# Patient Record
Sex: MALE | Race: WHITE | Employment: OTHER | URBAN - METROPOLITAN AREA
[De-identification: names, ages, dates, MRNs, and addresses within clinical notes are randomized per-mention and may not be internally consistent; named-entity substitution may affect disease eponyms.]

---

## 2020-02-13 ENCOUNTER — OFFICE VISIT (OUTPATIENT)
Dept: DERMATOLOGY | Facility: AMBULATORY SURGERY CENTER | Age: 85
End: 2020-02-13

## 2020-02-13 ENCOUNTER — HOSPITAL ENCOUNTER (OUTPATIENT)
Dept: LAB | Age: 85
Discharge: HOME | End: 2020-02-13

## 2020-02-13 VITALS
HEIGHT: 74 IN | TEMPERATURE: 98.2 F | BODY MASS INDEX: 25.03 KG/M2 | SYSTOLIC BLOOD PRESSURE: 160 MMHG | RESPIRATION RATE: 16 BRPM | HEART RATE: 71 BPM | OXYGEN SATURATION: 97 % | DIASTOLIC BLOOD PRESSURE: 62 MMHG | WEIGHT: 195 LBS

## 2020-02-13 DIAGNOSIS — D22.9 MULTIPLE BENIGN NEVI: ICD-10-CM

## 2020-02-13 DIAGNOSIS — L82.0 INFLAMED SEBORRHEIC KERATOSIS: Primary | ICD-10-CM

## 2020-02-13 DIAGNOSIS — L70.0 COMEDONE: ICD-10-CM

## 2020-02-13 DIAGNOSIS — D48.7 NEOPLASM OF UNCERTAIN BEHAVIOR OF NECK: ICD-10-CM

## 2020-02-13 DIAGNOSIS — D48.5 NEOPLASM OF UNCERTAIN BEHAVIOR OF SKIN OF BACK: ICD-10-CM

## 2020-02-13 DIAGNOSIS — L82.1 SEBORRHEIC KERATOSES: ICD-10-CM

## 2020-02-13 DIAGNOSIS — L72.9 CYST OF SKIN: ICD-10-CM

## 2020-02-13 RX ORDER — LOSARTAN POTASSIUM AND HYDROCHLOROTHIAZIDE 12.5; 1 MG/1; MG/1
1 TABLET ORAL
COMMUNITY

## 2020-02-13 RX ORDER — METFORMIN HYDROCHLORIDE 1000 MG/1
1000 TABLET ORAL
COMMUNITY
Start: 2019-10-30

## 2020-02-13 RX ORDER — ASPIRIN 325 MG
325 TABLET ORAL DAILY
COMMUNITY

## 2020-02-13 RX ORDER — ATORVASTATIN CALCIUM 20 MG/1
TABLET, FILM COATED ORAL
COMMUNITY
Start: 2019-12-03

## 2020-02-13 RX ORDER — AMLODIPINE BESYLATE AND ATORVASTATIN CALCIUM 5; 10 MG/1; MG/1
1 TABLET, FILM COATED ORAL
COMMUNITY

## 2020-02-13 RX ORDER — METOPROLOL SUCCINATE 50 MG/1
TABLET, EXTENDED RELEASE ORAL
COMMUNITY
Start: 2019-12-03

## 2020-02-13 RX ORDER — GLIPIZIDE 10 MG/1
10 TABLET, FILM COATED, EXTENDED RELEASE ORAL 2 TIMES DAILY
COMMUNITY

## 2020-02-13 NOTE — PROGRESS NOTES
Written by Johnna Quinn, as dictated by Peng Cleary, [address]. Name: Kaylen Manzo       Age: 82 y.o. Date: 2/13/2020    Chief Complaint: skin problem    Subjective:    HPI  Mr. Kaylen Manzo is a 82 y.o. male who presents as a new patient to [facility] for a skin exam. He is accompanied by his wife. The patient has had a skin exam in the past and the patient does have current complaints related to his skin. He first noticed the spot on his back when he went to the hospital in 08/2019, and it has been itching since that time. He states a nurse noted this as well and told him to have I checked during that hospitalization. He has also noticed a lesion on his right lateral neck that is irritating, itching and he will occasionally scratch. He has a cyst on his mid chest that first appeared 20 years ago, and has grown over the course of time. He only notices pain when the area is hit or pressed. He notes that it has been years since he last had any lesions or cysts removed. He is feeling well and in his usual state of health today. He has no current illnesses, no other skin concerns. His allergies, medications, medical, and social history are reviewed by me today. The patient's pertinent skin history includes:   No personal or FH of skin cancer but does admit to having cysts removed and one lesion that had to be removed twice    ROS: Constitutional: Negative.     Dermatological : positive for - skin lesion changes    Social History     Socioeconomic History    Marital status:      Spouse name: Not on file    Number of children: Not on file    Years of education: Not on file    Highest education level: Not on file   Occupational History    Not on file   Social Needs    Financial resource strain: Not on file    Food insecurity:     Worry: Not on file     Inability: Not on file    Transportation needs:     Medical: Not on file Non-medical: Not on file   Tobacco Use    Smoking status: Never Smoker    Smokeless tobacco: Never Used    Tobacco comment: Chewing tobacco every day 30-40 years    Substance and Sexual Activity    Alcohol use: Not Currently    Drug use: Never    Sexual activity: Not on file   Lifestyle    Physical activity:     Days per week: Not on file     Minutes per session: Not on file    Stress: Not on file   Relationships    Social connections:     Talks on phone: Not on file     Gets together: Not on file     Attends Faith service: Not on file     Active member of club or organization: Not on file     Attends meetings of clubs or organizations: Not on file     Relationship status: Not on file    Intimate partner violence:     Fear of current or ex partner: Not on file     Emotionally abused: Not on file     Physically abused: Not on file     Forced sexual activity: Not on file   Other Topics Concern    Not on file   Social History Narrative    Not on file       Family History   Problem Relation Age of Onset    Heart Disease Mother     Cancer Father     Cancer Sister     Cancer Brother        Past Medical History:   Diagnosis Date    Diabetes ([facility] [address]     Hypercholesteremia     Hypertension     Prostate cancer ([facility] [address]     Sunburn, blistering        Past Surgical History:   Procedure Laterality Date    HX CHOLECYSTECTOMY      HX CORONARY ARTERY BYPASS GRAFT      HX HERNIA REPAIR      HX LUMBAR FUSION             No Known Allergies      Objective:    Visit Vitals  /62 (BP 1 Location: Left arm, BP Patient Position: Sitting)   Pulse 71   Temp 98.2 °F (36.8 °C) (Oral)   Resp 16   Ht 6' 2\" (1.88 m)   Wt 195 lb (88.5 kg)   SpO2 97%   BMI 25.04 kg/m²       Wanda Cantrell is a 82 y.o. male who appears well and in no distress. He is awake, alert, and oriented.  A skin examination was performed including his back, neck and chest.  He has several cysts and comedones on his back that are not inflamed. There is a medium and dark brown macule that is 4 mm x 3mm on his left posterior neck. He has a 5 mm x 4 mm medium and dark brown, irregularly shaped macule on his left mid back. He has a pink macule 12 mm x 11 mm with hemorrhagic crusting on his right neck. There is an approximate 3 cm mobile, subcutaneous nodule on his mid chest that is non-inflamed and most consistent with cyst. Linear scar on the back does not have evidence of lesion recurrence. There is a scaly stuck on appearing papule on the left lateral upper back, pink with inflammation consistent with an inflamed SK - his lesion w/ pruritis. There are other scattered non inflamed SKs, pink and brown nevi without concerning features of severe atypia. Photos from today's visit:    Left Mid Back  Left Posterior Neck Right Neck   Mid Chest    Assessment/Plan:    1. Inflamed seborrheic keratoses. The diagnosis and treatment with liquid nitrogen cryotherapy were reviewed. The risk or persistence or recurrence of the keratosis and the potential for pigment change at the treated site were reviewed. Verbal consent was obtained. I treated 1 lesions with cryotherapy and care was reviewed. 2. Neoplasm of Uncertain Behavior, r/o atypical nevi, left posterior back. The differential diagnoses were discussed. Benefits and restrictions of biopsy, clinical monitoring, and molecular test with [employer] were discussed. [employer] was advised to test this lesion. The procedure was reviewed and verbal consent was obtained. The patient is aware that this is a molecular test and is intended for testing melanoma risk and not diagnosis of the skin lesion. I performed the procedure. The site was cleansed and scrubbed and four stickers were applied in sequential order. I will contact the patient with the results and any further treatment that may be necessary. 3. Neoplasm of Uncertain Behavior, r/o atypical nevi, left mid back.   The differential diagnoses were discussed. Benefits and restrictions of biopsy, clinical monitoring, and molecular test with [facility] were discussed. [employer] was advised to test this lesion. The procedure was reviewed and verbal consent was obtained. The patient is aware that this is a molecular test and is intended for testing melanoma risk and not diagnosis of the skin lesion. I performed the procedure. The site was cleansed and scrubbed and four stickers were applied in sequential order. I will contact the patient with the results and any further treatment that may be necessary. 4. Neoplasm of Uncertain Behavior, favor BCC, right neck. The differential diagnoses were discussed. A shave biopsy was advised to sample this lesion. The procedure was reviewed and verbal and written consent were obtained. The risks of pain, bleeding, infection, and scar were discussed. The patient is aware that this is a sample and is intended for diagnosis and not therapy of the skin lesion. I performed the procedure. The site was cleansed and anesthetized with 1% Lidocaine with Epinephrine 1:100,000. A shave biopsy was performed to sample the lesion. Drysol was used for hemostasis. The wound was bandaged and care reviewed. The specimen was sent to pathology. I will contact the patient with the results and any further treatment that may be necessary. 5.Seborrheic keratoses. The diagnosis was reviewed and the patient was reassured that no treatment is needed for these benign lesions. 6. Comdones, cysts. The patient would like the cyst on his chest removed. Discussed and pt seen w/ Dr. Jennifer Baugh - will schedule once the above issues are resolved. 7.Normal nevi. The diagnosis of normal nevi was reviewed. I discussed sun protection, sunscreen use, the warning signs of skin cancer, mole monitoring, the need for self-skin examinations, and the need for regular practitioner exams.   The patient should follow up sooner as needed if new, changing, or symptomatic skin lesions arise. [facility]   OFFICE PROCEDURE PROGRESS NOTE   Chart reviewed for the following:   Edgar GARAY, have reviewed the History, Physical and updated the Allergic reactions for [employer]. TIME OUT performed immediately prior to start of procedure:   Debra GARAY, have performed the following reviews on [employer]   prior to the start of the procedure:     * Patient was identified by name and date of birth   * Agreement on procedure being performed was verified   * Risks and Benefits explained to the patient   * Procedure site verified and marked as necessary   * Patient was positioned for comfort   * Consent was signed and verified     Time: 2:25  Date of procedure: 2/13/2020  Procedure performed by: Harmony Decker. Raphael Wood  Provider assisted by: Derick Bates LPN  Patient assisted by: self   How tolerated by patient: tolerated the procedure well with no complications   Comments: none    Next skin exam :  As Needed. This plan was reviewed with the patient and patient agrees. All questions were answered. This scribe documentation was reviewed by me and accurately reflects the examination and decisions made by me.

## 2020-02-19 NOTE — PROGRESS NOTES
I spoke w/ pt and he is aware of the diagnosis of [facility] which will need Mohs. He also need biopsy of nevi due to positive [employer] testing - I arranged this for this Friday. We will schedule appts based on those findings.

## 2020-02-21 ENCOUNTER — OFFICE VISIT (OUTPATIENT)
Dept: DERMATOLOGY | Facility: AMBULATORY SURGERY CENTER | Age: 85
End: 2020-02-21

## 2020-02-21 VITALS
DIASTOLIC BLOOD PRESSURE: 88 MMHG | RESPIRATION RATE: 18 BRPM | HEART RATE: 79 BPM | WEIGHT: 195 LBS | SYSTOLIC BLOOD PRESSURE: 156 MMHG | OXYGEN SATURATION: 97 % | HEIGHT: 74 IN | BODY MASS INDEX: 25.03 KG/M2 | TEMPERATURE: 98 F

## 2020-02-21 DIAGNOSIS — D48.5 NEOPLASM OF UNCERTAIN BEHAVIOR OF SKIN OF NECK: Primary | ICD-10-CM

## 2020-02-21 DIAGNOSIS — D48.5 NEOPLASM OF UNCERTAIN BEHAVIOR OF SKIN OF BACK: ICD-10-CM

## 2020-02-21 DIAGNOSIS — C44.41 BASAL CELL CARCINOMA (BCC) OF RIGHT SIDE OF NECK: ICD-10-CM

## 2020-02-21 NOTE — PROGRESS NOTES
Written by Edilson Aviles, as dictated by Saida Arnold Plan, [address]. Name: Fiona Aguilar       Age: 82 y.o. Date: 2/21/2020    Chief Complaint:   Chief Complaint   Patient presents with    Skin Exam     spots to biopsy        Subjective:    HPI:  Mr.. Fiona Aguilar is a 82 y.o. male who presents for biopsies of [employer] proven LINC positive lesions on the left posterior neck and the left mid back. He is feeling well and in his usual state of health today. He has no current illnesses, no other skin concerns. His allergies, medications, medical, and social history are reviewed by me today.     ROS: Consitutional: Negative  Dermatological : negative    Social History     Socioeconomic History    Marital status:      Spouse name: Not on file    Number of children: Not on file    Years of education: Not on file    Highest education level: Not on file   Occupational History    Not on file   Social Needs    Financial resource strain: Not on file    Food insecurity:     Worry: Not on file     Inability: Not on file    Transportation needs:     Medical: Not on file     Non-medical: Not on file   Tobacco Use    Smoking status: Never Smoker    Smokeless tobacco: Never Used    Tobacco comment: Chewing tobacco every day 30-40 years    Substance and Sexual Activity    Alcohol use: Not Currently    Drug use: Never    Sexual activity: Not on file   Lifestyle    Physical activity:     Days per week: Not on file     Minutes per session: Not on file    Stress: Not on file   Relationships    Social connections:     Talks on phone: Not on file     Gets together: Not on file     Attends Yazidism service: Not on file     Active member of club or organization: Not on file     Attends meetings of clubs or organizations: Not on file     Relationship status: Not on file    Intimate partner violence:     Fear of current or ex partner: Not on file     Emotionally abused: Not on file Physically abused: Not on file     Forced sexual activity: Not on file   Other Topics Concern    Not on file   Social History Narrative    Not on file       Family History   Problem Relation Age of Onset    Heart Disease Mother     Cancer Father     Cancer Sister     Cancer Brother        Past Medical History:   Diagnosis Date    Diabetes ([facility] [address]     Hypercholesteremia     Hypertension     Prostate cancer ([facility] [address]     Sunburn, blistering        Past Surgical History:   Procedure Laterality Date    HX CHOLECYSTECTOMY      HX CORONARY ARTERY BYPASS GRAFT      HX HERNIA REPAIR      HX LUMBAR FUSION         Current Outpatient Medications   Medication Sig Dispense Refill    amLODIPine-atorvastatin (CADUET) 5-10 mg per tablet Take 1 Tab by mouth.  atorvastatin (LIPITOR) 20 mg tablet TAKE 1 TABLET BY MOUTH DAILY      glipiZIDE SR (GLUCOTROL XL) 10 mg CR tablet Take 10 mg by mouth two (2) times a day.  losartan-hydroCHLOROthiazide (HYZAAR) 100-12.5 mg per tablet Take 1 Tab by mouth.  metFORMIN (GLUCOPHAGE) 1,000 mg tablet Take 1,000 mg by mouth.  metoprolol succinate (TOPROL-XL) 50 mg XL tablet TAKE 1 TABLET BY MOUTH DAILY      aspirin (ASPIRIN) 325 mg tablet Take 325 mg by mouth daily. No Known Allergies      Objective:    Visit Vitals  /88 (BP 1 Location: Left arm, BP Patient Position: Sitting)   Pulse 79   Temp 98 °F (36.7 °C) (Oral)   Resp 18   Ht 6' 2\" (1.88 m)   Wt 195 lb (88.5 kg)   SpO2 97%   BMI 25.04 kg/m²       May Rivers is a 82 y.o. male who appears well and in no distress. He is awake, alert, and oriented. A limited skin examination was completed including the neck and back. He has a 5 mm x 4 mm medium and dark brown, irregularly shaped macule on his left mid back, R/O atypical pigmented lesion. He has a pink macule 12 mm x 11 mm with hemorrhagic crusting on his right neck, - this is healing from recent biopsy - proving BCC. There is a medium and dark brown macule that is 4 mm x 3mm on his left posterior neck, r/o atypical pigmented lesion    Photos from today's visit:       Left neck   Left mid back    Assessment/Plan:  Neoplasm of Uncertain Behavior, left posterior neck, R/O atypical pigmented lesion . The differential diagnoses were discussed. A shave biopsy was advised to sample this lesion. The procedure was reviewed and verbal and written consent were obtained. The risks of pain, bleeding, infection, and scar were discussed. The patient is aware that this is a sample and is intended for diagnosis and not therapy of the skin lesion. I performed the procedure. The site was cleansed and anesthetized with 1% Lidocaine with Epinephrine 1:100,000. A shave biopsy was performed to sample the lesion. Drysol was used for hemostasis. The wound was bandaged and care reviewed. The specimen was sent to pathology. I will contact the patient with the results and any further treatment that may be necessary. Neoplasm of Uncertain Behavior, left mid back, R/O atypical pigmented lesion. The differential diagnoses were discussed. A shave biopsy was advised to sample this lesion. The procedure was reviewed and verbal and written consent were obtained. The risks of pain, bleeding, infection, and scar were discussed. The patient is aware that this is a sample and is intended for diagnosis and not therapy of the skin lesion. I performed the procedure. The site was cleansed and anesthetized with 1% Lidocaine with Epinephrine 1:100,000. A shave biopsy was performed to sample the lesion. Drysol was used for hemostasis. The wound was bandaged and care reviewed. The specimen was sent to pathology. I will contact the patient with the results and any further treatment that may be necessary. Basal cell carcinoma of the right lateral neck. Will schedule an appt for Mohs. This plan was reviewed with the patient and patient agrees.  All questions were answered. This scribe documentation was reviewed by me and accurately reflects the examination and decisions made by me. [facility]   OFFICE PROCEDURE PROGRESS NOTE   Chart reviewed for the following:   Teodora GARAY, have reviewed the History, Physical and updated the Allergic reactions for Ethel Guillen. TIME OUT performed immediately prior to start of procedure:   Debra GARAY, have performed the following reviews on Ethel Guillen   prior to the start of the procedure:     * Patient was identified by name and date of birth   * Agreement on procedure being performed was verified   * Risks and Benefits explained to the patient   * Procedure site verified and marked as necessary   * Patient was positioned for comfort   * Consent was signed and verified     Time: 9:40 pm  Date of procedure: 2/21/2020  Procedure performed by: Prabhjot Escobedo.  Remberto Briggs  Provider assisted by: Hilaria Jin LPN   Patient assisted by: self   How tolerated by patient: tolerated the procedure well with no complications   Comments: none

## 2020-02-22 ENCOUNTER — HOSPITAL ENCOUNTER (OUTPATIENT)
Dept: LAB | Age: 85
Discharge: HOME | End: 2020-02-22

## 2020-03-04 ENCOUNTER — TELEPHONE (OUTPATIENT)
Dept: DERMATOLOGY | Facility: AMBULATORY SURGERY CENTER | Age: 85
End: 2020-03-04

## 2020-03-04 NOTE — TELEPHONE ENCOUNTER
I spoke with the pt and he is aware of the diagnosis of moderate atypical nevus on the neck and moderate to severe atypical nevus on the back - suggested further removal by pathologist but clear margins were obtained. He has an appt for Mohs on 3/16 and hopefully a small excision or re-shave can be done with this appt time allowing.

## 2020-03-16 ENCOUNTER — HOSPITAL ENCOUNTER (OUTPATIENT)
Dept: LAB | Age: 85
Discharge: HOME | End: 2020-03-16

## 2020-03-16 ENCOUNTER — OFFICE VISIT (OUTPATIENT)
Dept: DERMATOLOGY | Facility: AMBULATORY SURGERY CENTER | Age: 85
End: 2020-03-16

## 2020-03-16 VITALS
BODY MASS INDEX: 25.03 KG/M2 | TEMPERATURE: 98 F | HEIGHT: 74 IN | RESPIRATION RATE: 14 BRPM | WEIGHT: 195 LBS | SYSTOLIC BLOOD PRESSURE: 130 MMHG | DIASTOLIC BLOOD PRESSURE: 70 MMHG | HEART RATE: 79 BPM | OXYGEN SATURATION: 99 %

## 2020-03-16 DIAGNOSIS — D22.4 ATYPICAL NEVUS OF NECK: Primary | ICD-10-CM

## 2020-03-16 DIAGNOSIS — C44.41 BASAL CELL CARCINOMA (BCC) OF RIGHT SIDE OF NECK: Primary | ICD-10-CM

## 2020-03-16 DIAGNOSIS — D22.5 ATYPICAL NEVUS OF BACK: ICD-10-CM

## 2020-03-16 NOTE — PROGRESS NOTES
Luly Daly is a 82 y.o. male presents to the office today with the following:  Chief Complaint   Patient presents with    Surgery     Excision - 2 sites on back       53-year-old white male presents for surgical excision of biopsy-proven dysplastic nevus on the left neck and biopsy-proven dysplastic nevus on the mid back. They were recently biopsied by Devin Flowers NP. Is not any problems with the biopsy site since that time. He is otherwise in his normal state of good health and has no additional skin complaints today. Of note this morning he was treated with Mohs surgery by me for a biopsy-proven basal cell carcinoma on the right neck. Physical Exam  HENT:      Head: Normocephalic. Pulmonary:      Effort: Pulmonary effort is normal.   Skin:     Comments: On the left posterior neck there is a crusted papule. On the mid back there is a red crusted plaque. Neurological:      Mental Status: He is alert and oriented to person, place, and time. 1. Atypical nevus of back  Excision was advised for removal and therapy of this 1.0 cm biopsy-proven dysplastic nevus on the left mid back. The excision procedure was reviewed and verbal and written consents were obtained. The site was identified and confirmed with the patient. The risks of pain, bleeding, infection, recurrence of the lesion, and scar were discussed. I performed the procedure. The site was cleansed and anesthetized with 1% lidocaine with epinephrine 1:100,000. The lesion was excised with a 3 mm margin in an elliptical manner to a depth of subcutaneous tissue. A intermediate primary closure was performed after the excision using 4-0 Maxon buried vertical mattress sutures and Tegaderm. The closure length was 5.6 cm. The wound was bandaged and care reviewed. The specimen was sent to pathology.   I will contact the patient with the results and any further treatment that may be necessary.          - SURGICAL PATHOLOGY; Future    2. Atypical nevus of neck  Excision was advised for removal and therapy of this 1.0 cm biopsy-proven dysplastic nevus on the left posterior neck. The excision procedure was reviewed and verbal and written consents were obtained. The site was identified and confirmed with the patient. The risks of pain, bleeding, infection, recurrence of the lesion, and scar were discussed. I performed the procedure. The site was cleansed and anesthetized with 1% lidocaine with epinephrine 1:100,000. The lesion was excised with a 2 mm margin in an elliptical manner to a depth of subcutaneous tissue. A intermediate primary closure was performed after the excision using 4-0 Maxon buried vertical mattress sutures and Tegaderm. The closure length was 4.3 cm. The wound was bandaged and care reviewed. The specimen was sent to pathology. I will contact the patient with the results and any further treatment that may be necessary.      - SURGICAL PATHOLOGY; Future      Follow-up and Dispositions    · Return if symptoms worsen or fail to improve.          Josie Lawton MD

## 2020-03-16 NOTE — PATIENT INSTRUCTIONS
Chief Complaint   Patient presents with    Surgery     Mohs - right neck, BCC     WOUND CARE INSTRUCTIONS     1. Keep the dressing clean and dry and do not remove for 48 hours. 2. Then change the dressing once a day as follows:  a. Wash hands before and after each dressing change. b. Remove dressing and wash site gently with mild soap and water, rinse, and pat dry.  c. Apply liberal amounts of an ointment (Petroleum jelly or Aquaphor). d. Apply a non-stick (Telfa) dressing or Band-Aid to cover the wound. 3. Watch for:  BLEEDING: A small amount of drainage may occur. If bleeding occurs, elevate and rest the surgery site. Apply gauze and steady pressure for 20 minutes. If bleeding continues repeat pressure once more. If bleeding still does not stop, call this office. If after hours, call Dr. Georgie Olea at [phone]. INFECTION: Signs of infection include increased redness, pain, warmth, drainage of pus, and fever. If this occurs, please call this office. 4. Special Instructions (follow any that are checked):  · [x] You have stitches that DO NOT need to be removed. · [x] Avoid bending at the waist and heavy lifting for two days. · [x] Sleep with your head elevated for the next two nights. Follow up as directed - as needed    Take Tylenol for pain as needed. If you have any questions or concerns, please call our office Monday through Friday at [phone]. If after hours, please call Dr. Georgie Olea at [phone].

## 2020-03-16 NOTE — PROGRESS NOTES
Progress note for Mohs surgery patient:    Chief Complaint:  Basal cell carcinoma of the Right neck    HPI:  Stefano Ramirez is a 82y.o. year old male referred by  Vero Romero NP for Mohs surgery to treat the following lesion:  Lesion Info  Location: Right neck  Size: 2.0 x 1.4 cm  Type: Basal  Duration: unknown  Path Lab: [facility]  Path #: [id number]  Prior Treatment: none     Symptoms of the lesion include none. ROS:  Brandy Will is feeling well and in their usual state of health today. He is not in pain. He does not have any other skin concerns. Exam:  Brandy Will is an awake, alert, oriented, well-appearing male in no distress. There is not preauricular, submandibular, or cervical lymphadenopathy. The face was examined. Findings are:  On the right neck there is a broad scaly telangiectatic plaque. A/P:  Basal cell carcinoma of the Right neck. The diagnosis was reviewed. The Mohs surgery procedure was reviewed. Indications, risks, and options were discussed with Mr. Anita Byrd preoperatively. Risks including, but not limited to: pain, bleeding, infection, tumor recurrence, scarring and damage to motor and/or sensory nerves, were discussed. Mr. Anita Byrd chose Mohs surgery. Mr. Anita Byrd was an acceptable surgery candidate. I performed Mohs surgery using standard technique after verbal and written consent were obtained. The lesion was identified and confirmed with the patient and photograph, if available. The surgical site was marked with gentian violet, prepped, draped and anesthetized in standard fashion. The tumor was debulked by curettage and orientation hashes were placed. The tumor and any associated scar was excised using beveled incision. Hemostasis was achieved, the site was bandaged, and the tissue was transported to the Mohs lab.   While maintaining anatomic orientation the tissue was divided, if needed, and marked with colored inks that were noted on the corresponding Mohs map.  The tissue was prepared by Mohs en-face technique for fresh frozen section analysis. The resulting slides were examined for residual tumor, scar and other concerns, all of which were marked on the corresponding Mohs map, if present. The Mohs map was used to guide subsequent stages of surgery, if necessary, and the above process was repeated until a tumor-free plane was achieved. Once the tumor was cleared the map was marked as such and signed. Dr. Linda Aranda acted as surgeon and pathologist for the entire case, performing all stages of the surgical excision as well as examination and interpretation of the histologic slides. See table below for details regarding the surgical case. 1 stage(s) were required to reach a tumor-free plane, resulting in a 2.0 x 1.4 cm defect extending to the subcutaneous tissue. There were not complications. Mylinda Roles will follow up as needed in the postoperative period. Regular skin examinations will be with  Vesta Cleaning NP. The wound management options of second intent healing, layered closure, local flap, and/or full thickness skin graft were discussed. Mr. Zhou Almodovar understands the aims, risks, alternatives, and possible complications and elects to proceed with a complex layered closure. Wound margins were debeveled, standing cones were corrected and the defect was widely undermined in the subcutaneous plane for a distance of 1.5 cm. The wound was closed with buried 4-0 vicryl suture in the muscle and deep subcutis to reduce width of the wound and a second layer in the dermis to reduce tension on the skin edges with careful attention to edge apposition and eversion for optimal cosmesis. Epidermal edges were carefully approximated with 5-0 fast absorbing gut suture, again with careful attention to apposition and eversion. The final closure length was Closure Length: 6.0 cm. The wound was bandaged with Petrolatum ointment, Telfa, gauze and Coverroll.  Wound care instructions (written and/or verbal) and a follow up appointment were given to Mr. Anabell Armendariz before discharge. Mr. Anabell Armendariz was discharged in good condition. Right neck  Mohs Lesion Operative Report  Date: 03/16/20  Room: PR2 and Exam 2  Indications: Poor definition, Site, Size  Pre-op Meds: n/a  Pre-op BP: 148/68  Pre-op pulse: 81  1st Assistant: Dilcia Champagne RN  Stage #: 1  Stage 1 Sections: 1  Stage 1 # Pos: 0  Perineural Involvement: No  Lymphadenopathy: No  Defect Size: 2.0 x 1.4 cm  Depth: subcutaneous tissue  Wound Mgt: complex linear closure  Donor Site: n/a  Suture: Buried, Surface  Buried details: 4-0 vicryl  Surface Details: 5-0 fast  Undermining: SubQ  Closure Length: 6.0 cm  Estimated Blood Loss: 2 mL  Hemostasis: Electrosurgery, Pressure  Anesthesia: 1% Lidocaine w/1:100,000 epi  1% Lidocaine: 9 cc  Complications: n/a  Dressing: vaseline, telfa, gauze, medipore tape  Post-op BP: 130/70  Post-op Pulse: 79  Pos-op Meds: n/a  W/C Instructions: Verbal, Written  Follow-up: as needed      [facility]   OFFICE PROCEDURE PROGRESS NOTE   Chart reviewed for the following:   IKenneth MD have reviewed the History, Physical and updated the Allergic reactions for Ava Hunt. TIME OUT performed immediately prior to start of procedure:   Della Muñoz MD, have performed the following reviews on Ava Hunt   prior to the start of the procedure:     * Patient was identified by name and date of birth   * Agreement on procedure being performed was verified   * Risks and Benefits explained to the patient   * Procedure site verified and marked as necessary   * Patient was positioned for comfort   * Consent was signed and verified     Time: 8:45 AM  Date of procedure: 3/16/2020  Procedure performed by:  Kenneth Staton MD  Provider assisted by: Dilcia Champagne RN  Patient assisted by: self   How tolerated by patient: tolerated the procedure well with no complications   Comments: none

## 2020-03-16 NOTE — PATIENT INSTRUCTIONS
Chief Complaint   Patient presents with    Surgery     Excision - 2 sites on back     WOUND CARE INSTRUCTIONS FOR BOTH SITES:    1. Keep the WHITE dressing clean and dry and do not remove for 48 hours. After 48 hours you may carefully remove the white tape and guaze. Leave clear dressing in place until it falls off (usually 10-14 days). You may shower with the clear dressing in place. If clear bandage stays on for 10-14 days, no subsequent wound care is needed. If clear bandage comes off before 10 days, follow directions below until wound is completely healed. 2. Then change the dressing once a day as follows:  a. Wash hands before and after each dressing change. b. Remove dressing and wash site gently with mild soap and water, rinse, and pat dry.  c. Apply liberal amounts of an ointment (Petroleum jelly or Aquaphor). d. Apply a non-stick (Telfa) dressing or Band-Aid to cover the wound. 3. Watch for:  BLEEDING: A small amount of drainage may occur. If bleeding occurs, elevate and rest the surgery site. Apply gauze and steady pressure for 20 minutes. If bleeding continues repeat pressure once more. If bleeding still does not stop, call this office. If after hours, call Dr. Shu Butt at [phone]. INFECTION: Signs of infection include increased redness, pain, warmth, drainage of pus, and fever. If this occurs, please call this office. 4. Special Instructions (follow any that are checked): Same as neck instructions. Take Tylenol  for pain as needed. If you have any questions or concerns, please call our office Monday through Friday at [phone]. If after hours, please call Dr. Shu Butt at [phone].

## 2020-03-18 NOTE — PROGRESS NOTES
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 No further treatment is needed at this time. Thank you.

## 2021-09-28 ENCOUNTER — TRANSCRIBE ORDER (OUTPATIENT)
Dept: SCHEDULING | Age: 86
End: 2021-09-28

## 2021-09-28 DIAGNOSIS — I73.9 PERIPHERAL VASCULAR DISEASE, UNSPECIFIED (HCC): Primary | ICD-10-CM

## 2021-10-05 ENCOUNTER — HOSPITAL ENCOUNTER (OUTPATIENT)
Dept: VASCULAR SURGERY | Age: 86
Discharge: HOME | End: 2021-10-05
Attending: PODIATRIST
Payer: MEDICARE

## 2021-10-05 DIAGNOSIS — I73.9 PERIPHERAL VASCULAR DISEASE, UNSPECIFIED (HCC): ICD-10-CM

## 2021-10-05 LAB
LEFT ARM BP: 158 MMHG
LEFT TBI: 0.26
LEFT TOE PRESSURE: 43 MMHG
RIGHT ABI: 0.54
RIGHT ARM BP: 167 MMHG
RIGHT POSTERIOR TIBIAL: 88 MMHG
RIGHT TBI: 0
RIGHT TOE PRESSURE: 0 MMHG
VAS RIGHT DORSALIS PEDIS BP: 90 MMHG

## 2021-10-05 PROCEDURE — 93922 UPR/L XTREMITY ART 2 LEVELS: CPT

## 2023-05-10 RX ORDER — AMLODIPINE BESYLATE AND ATORVASTATIN CALCIUM 5; 10 MG/1; MG/1
1 TABLET, FILM COATED ORAL
COMMUNITY

## 2023-05-10 RX ORDER — ATORVASTATIN CALCIUM 20 MG/1
1 TABLET, FILM COATED ORAL DAILY
COMMUNITY
Start: 2019-12-03

## 2023-05-10 RX ORDER — LOSARTAN POTASSIUM AND HYDROCHLOROTHIAZIDE 12.5; 1 MG/1; MG/1
1 TABLET ORAL
COMMUNITY

## 2023-05-10 RX ORDER — ASPIRIN 325 MG
325 TABLET ORAL DAILY
COMMUNITY

## 2023-05-10 RX ORDER — GLIPIZIDE 10 MG/1
TABLET, FILM COATED, EXTENDED RELEASE ORAL 2 TIMES DAILY
COMMUNITY

## 2023-05-10 RX ORDER — METOPROLOL SUCCINATE 50 MG/1
1 TABLET, EXTENDED RELEASE ORAL DAILY
COMMUNITY
Start: 2019-12-03

## 2024-03-07 ENCOUNTER — APPOINTMENT (OUTPATIENT)
Facility: HOSPITAL | Age: 89
End: 2024-03-07
Payer: MEDICARE

## 2024-03-07 ENCOUNTER — HOSPITAL ENCOUNTER (OUTPATIENT)
Facility: HOSPITAL | Age: 89
Discharge: HOME | End: 2024-03-07
Attending: RADIOLOGY
Payer: MEDICARE

## 2024-03-07 ENCOUNTER — HOSPITAL ENCOUNTER (EMERGENCY)
Facility: HOSPITAL | Age: 89
Discharge: HOME | End: 2024-03-07
Attending: EMERGENCY MEDICINE
Payer: MEDICARE

## 2024-03-07 VITALS
DIASTOLIC BLOOD PRESSURE: 58 MMHG | SYSTOLIC BLOOD PRESSURE: 139 MMHG | TEMPERATURE: 98 F | HEART RATE: 69 BPM | RESPIRATION RATE: 18 BRPM

## 2024-03-07 VITALS
WEIGHT: 150 LBS | HEART RATE: 61 BPM | SYSTOLIC BLOOD PRESSURE: 128 MMHG | TEMPERATURE: 98 F | DIASTOLIC BLOOD PRESSURE: 55 MMHG | BODY MASS INDEX: 19.25 KG/M2 | RESPIRATION RATE: 16 BRPM | HEIGHT: 74 IN | OXYGEN SATURATION: 95 %

## 2024-03-07 DIAGNOSIS — L97.423 DIABETIC ULCER OF LEFT HEEL ASSOCIATED WITH TYPE 2 DIABETES MELLITUS, WITH NECROSIS OF MUSCLE (HCC): Primary | ICD-10-CM

## 2024-03-07 DIAGNOSIS — E11.621 DIABETIC ULCER OF LEFT HEEL ASSOCIATED WITH TYPE 2 DIABETES MELLITUS, WITH NECROSIS OF MUSCLE (HCC): Primary | ICD-10-CM

## 2024-03-07 DIAGNOSIS — E13.52: ICD-10-CM

## 2024-03-07 DIAGNOSIS — L08.9 SOFT TISSUE INFECTION: Primary | ICD-10-CM

## 2024-03-07 DIAGNOSIS — I96 GANGRENE OF BOTH FEET (HCC): ICD-10-CM

## 2024-03-07 LAB
ALBUMIN SERPL-MCNC: 2.4 G/DL (ref 3.5–5)
ALBUMIN/GLOB SERPL: 0.6 (ref 1.1–2.2)
ALP SERPL-CCNC: 98 U/L (ref 45–117)
ALT SERPL-CCNC: 24 U/L (ref 12–78)
ANION GAP SERPL CALC-SCNC: 9 MMOL/L (ref 5–15)
AST SERPL-CCNC: 23 U/L (ref 15–37)
BASOPHILS # BLD: 0 K/UL (ref 0–0.1)
BASOPHILS NFR BLD: 0 % (ref 0–1)
BILIRUB SERPL-MCNC: 1 MG/DL (ref 0.2–1)
BUN SERPL-MCNC: 20 MG/DL (ref 6–20)
BUN/CREAT SERPL: 19 (ref 12–20)
CALCIUM SERPL-MCNC: 8.9 MG/DL (ref 8.5–10.1)
CHLORIDE SERPL-SCNC: 110 MMOL/L (ref 97–108)
CO2 SERPL-SCNC: 22 MMOL/L (ref 21–32)
COMMENT:: NORMAL
CREAT SERPL-MCNC: 1.06 MG/DL (ref 0.7–1.3)
DIFFERENTIAL METHOD BLD: ABNORMAL
EOSINOPHIL # BLD: 0 K/UL (ref 0–0.4)
EOSINOPHIL NFR BLD: 0 % (ref 0–7)
ERYTHROCYTE [DISTWIDTH] IN BLOOD BY AUTOMATED COUNT: 18.1 % (ref 11.5–14.5)
GLOBULIN SER CALC-MCNC: 3.7 G/DL (ref 2–4)
GLUCOSE SERPL-MCNC: 243 MG/DL (ref 65–100)
HCT VFR BLD AUTO: 38 % (ref 36.6–50.3)
HGB BLD-MCNC: 12.1 G/DL (ref 12.1–17)
IMM GRANULOCYTES # BLD AUTO: 0.1 K/UL (ref 0–0.04)
IMM GRANULOCYTES NFR BLD AUTO: 1 % (ref 0–0.5)
LACTATE SERPL-SCNC: 1.4 MMOL/L (ref 0.4–2)
LYMPHOCYTES # BLD: 0.7 K/UL (ref 0.8–3.5)
LYMPHOCYTES NFR BLD: 6 % (ref 12–49)
MCH RBC QN AUTO: 27.3 PG (ref 26–34)
MCHC RBC AUTO-ENTMCNC: 31.8 G/DL (ref 30–36.5)
MCV RBC AUTO: 85.8 FL (ref 80–99)
MONOCYTES # BLD: 0.7 K/UL (ref 0–1)
MONOCYTES NFR BLD: 6 % (ref 5–13)
NEUTS SEG # BLD: 10.2 K/UL (ref 1.8–8)
NEUTS SEG NFR BLD: 87 % (ref 32–75)
NRBC # BLD: 0 K/UL (ref 0–0.01)
NRBC BLD-RTO: 0 PER 100 WBC
PLATELET # BLD AUTO: 181 K/UL (ref 150–400)
PMV BLD AUTO: 12.1 FL (ref 8.9–12.9)
POTASSIUM SERPL-SCNC: 4.4 MMOL/L (ref 3.5–5.1)
PROT SERPL-MCNC: 6.1 G/DL (ref 6.4–8.2)
RBC # BLD AUTO: 4.43 M/UL (ref 4.1–5.7)
RBC MORPH BLD: ABNORMAL
RBC MORPH BLD: ABNORMAL
SODIUM SERPL-SCNC: 141 MMOL/L (ref 136–145)
SPECIMEN HOLD: NORMAL
WBC # BLD AUTO: 11.7 K/UL (ref 4.1–11.1)

## 2024-03-07 PROCEDURE — 85025 COMPLETE CBC W/AUTO DIFF WBC: CPT

## 2024-03-07 PROCEDURE — 73630 X-RAY EXAM OF FOOT: CPT

## 2024-03-07 PROCEDURE — 99284 EMERGENCY DEPT VISIT MOD MDM: CPT

## 2024-03-07 PROCEDURE — 80053 COMPREHEN METABOLIC PANEL: CPT

## 2024-03-07 PROCEDURE — 99205 OFFICE O/P NEW HI 60 MIN: CPT

## 2024-03-07 PROCEDURE — 73620 X-RAY EXAM OF FOOT: CPT

## 2024-03-07 PROCEDURE — 36415 COLL VENOUS BLD VENIPUNCTURE: CPT

## 2024-03-07 PROCEDURE — 83605 ASSAY OF LACTIC ACID: CPT

## 2024-03-07 RX ORDER — CEFDINIR 300 MG/1
300 CAPSULE ORAL 2 TIMES DAILY
Qty: 20 CAPSULE | Refills: 0 | Status: SHIPPED | OUTPATIENT
Start: 2024-03-07 | End: 2024-03-17

## 2024-03-07 ASSESSMENT — PAIN SCALES - GENERAL: PAINLEVEL_OUTOF10: 4

## 2024-03-07 NOTE — PATIENT INSTRUCTIONS
Discharge Instructions/Wound Care Orders  [facility]   [address] [address]  Phone: [phone] Fax: [phone]    NAME:  Franklin Leger  YOB: 1935  MEDICAL RECORD NUMBER:  [id number]  DATE:  March 7, 2024    Wound Care Orders:  Bilateral foot wounds - Place vashe wet to dry dressing. Cover with gauze roll and secure with tape. Leave in place to go to ER.     Activity:  [x] Elevate leg(s) above the level of the heart when sitting and avoid prolonged standing in one place.    [x] Wear off-loading shoe/boot on the affected foot when walking.  [x] Do not get dressing wet.    Dietary:  [] Diet as tolerated      [x] Diabetic Diet            [] Increase Protein: examples (Meat, cheese, eggs, greek yogurt, fish, nuts)          [x] [employer] Nutrition Powder    Follow-up:  [x] Return Appointment: With Dr. Tanya Rothman 1 week after hospital visit.   Please go to ER today for left heel infection.   [] Ordered tests:       Electronically signed by JESSE VARGAS RN  on 3/7/2024 at 9:57 AM     Wound Care Center Information: Should you experience any significant changes in your wound(s) or have questions about your wound care, please contact the [facility] at MONDAY - FRIDAY 8:00 am - 4:30.  If you need help with your wound outside these hours and cannot wait until we are again available, contact your PCP or go to the hospital emergency room.     PLEASE NOTE: IF YOU ARE UNABLE TO OBTAIN WOUND SUPPLIES, CONTINUE TO USE THE SUPPLIES YOU HAVE AVAILABLE UNTIL YOU ARE ABLE TO REACH US. IT IS MOST IMPORTANT TO KEEP THE WOUND COVERED AT ALL TIMES.     Physician Signature:_______________________    Date: ___________ Time:  ____________

## 2024-03-07 NOTE — FLOWSHEET NOTE
Unable to perform med reconciliation. Patient is unsure what he takes and son in law is not sure. Patient states he will bring med list to next appointment.     03/07/24 0922   Wound 03/07/24 Heel Left   Date First Assessed/Time First Assessed: 03/07/24 0939   Present on Original Admission: Yes  Wound Approximate Age at First Assessment (Weeks): 8 weeks  Primary Wound Type: Pressure Injury  Location: Heel  Wound Location Orientation: Left   Wound Image    Dressing Status Old drainage noted;New drainage noted;Intact   Wound Cleansed Irrigated with saline   Offloading for Diabetic Foot Ulcers Offloading not ordered   Wound Length (cm) 11 cm   Wound Width (cm) 7.5 cm   Wound Depth (cm) 0.4 cm   Wound Surface Area (cm^2) 82.5 cm^2   Wound Volume (cm^3) 33 cm^3   Wound Assessment Eschar moist;Slough   Drainage Amount Moderate (25-50%)   Drainage Description Serosanguinous   Odor Malodorous/putrid   Jacqueline-wound Assessment Blanchable erythema;Fragile;Excoriated;Maceration   Margins Epibole (rolled edges)   Wound Thickness Description not for Pressure Injury Full thickness   Wound 03/07/24 Toe (Comment  which one) Left;Anterior third toe   Date First Assessed/Time First Assessed: 03/07/24 0945   Present on Original Admission: Yes  Location: Toe (Comment  which one)  Wound Location Orientation: Left;Anterior  Wound Description (Comments): third toe   Wound Image    Dressing Status   (narcisa)   Wound Cleansed Soap and water   Offloading for Diabetic Foot Ulcers Offloading not ordered   Wound Length (cm) 1.3 cm   Wound Width (cm) 1 cm   Wound Depth (cm) 0.1 cm   Wound Surface Area (cm^2) 1.3 cm^2   Wound Volume (cm^3) 0.13 cm^3   Wound Assessment Eschar moist   Drainage Amount Small (< 25%)   Drainage Description Serosanguinous   Odor None   Jacqueline-wound Assessment Dry/flaky;Fragile;Blanchable erythema   Margins Defined edges   Wound Thickness Description not for Pressure Injury Full thickness   Wound 03/07/24 Toe (Comment  which 
roll gauze; tape)   Offloading for Diabetic Foot Ulcers Offloading not ordered   Wound 03/07/24 Ankle Right;Lateral   Date First Assessed/Time First Assessed: 03/07/24 0948   Present on Original Admission: Yes  Location: Ankle  Wound Location Orientation: Right;Lateral   Dressing Status New dressing applied   Wound Cleansed Vashe   Dressing/Treatment   (Vashe wet-to-dry; roll gauze; tape)   Offloading for Diabetic Foot Ulcers Offloading not ordered   Pain Assessment   Pain Assessment None - Denies Pain     Discharge Condition: Stable    Pain: 0    Ambulatory Status:Wheelchair    Discharge Destination: Emergency Department    Transportation:Car    Accompanied by: Family    Discharge instructions reviewed with Self and Family and copy or written instructions have been provided. All questions/concerns have been addressed at this time.

## 2024-03-07 NOTE — ED PROVIDER NOTES
EMERGENCY DEPARTMENT PHYSICIAN NOTE     Patient: Franklin Leger     Time of Service: 3/7/2024  2:35 PM     Chief complaint:   Chief Complaint   Patient presents with    Wound Check        HISTORY:  Patient is a 88 y.o. male who presents to the emergency department with complaints of left heel wound check.       Past Medical History:   Diagnosis Date    Cerebral artery occlusion with cerebral infarction (HCC)     CHF (congestive heart failure) (HCC)     Diabetes (HCC)     Hx of blood clots     Hypercholesteremia     Hypertension     Prostate cancer (HCC)     Skin cancer     Sunburn, blistering         Past Surgical History:   Procedure Laterality Date    CHOLECYSTECTOMY      CORONARY ARTERY BYPASS GRAFT      HERNIA REPAIR      LUMBAR FUSION          Family History   Problem Relation Age of Onset    Cancer Sister     Cancer Father     Heart Disease Mother     Cancer Brother         Social History     Socioeconomic History    Marital status:    Tobacco Use    Smoking status: Never    Smokeless tobacco: Current     Types: Chew    Tobacco comments:     Quit smoking: Chewing tobacco every day 30-40 years; will  try to quit during wound healing.   Substance and Sexual Activity    Alcohol use: Not Currently    Drug use: Never        Current Medications: Reviewed in chart.    Allergies: No Known Allergies       REVIEW OF SYSTEMS: See HPI for pertinent positives and negatives.      PHYSICAL EXAM:  BP (!) 128/55   Pulse 61   Temp 98 °F (36.7 °C)   Resp 16   Ht 1.88 m (6' 2\")   Wt 68 kg (150 lb)   SpO2 95%   BMI 19.26 kg/m²    Physical Exam  Vitals and nursing note reviewed.   Constitutional:       General: He is not in acute distress.     Appearance: Normal appearance. He is normal weight. He is not toxic-appearing.   HENT:      Head: Normocephalic and atraumatic.      Nose: Nose normal.      Mouth/Throat:      Mouth: Mucous membranes are moist.      Pharynx: Oropharynx is clear.   Eyes:      Extraocular     RDW 18.1 (*)     Neutrophils % 87 (*)     Lymphocytes % 6 (*)     Immature Granulocytes 1 (*)     Neutrophils Absolute 10.2 (*)     Lymphocytes Absolute 0.7 (*)     Absolute Immature Granulocyte 0.1 (*)     All other components within normal limits   COMPREHENSIVE METABOLIC PANEL - Abnormal; Notable for the following components:    Chloride 110 (*)     Glucose 243 (*)     Total Protein 6.1 (*)     Albumin 2.4 (*)     Albumin/Globulin Ratio 0.6 (*)     All other components within normal limits   EXTRA TUBES HOLD   LACTIC ACID   LACTIC ACID     ED physician interpretation of laboratory results: Documented in ED course    Imaging Results:  XR FOOT LEFT (MIN 3 VIEWS)   Final Result   No acute bony abnormality. Soft tissue swelling greatest over the   dorsum of the forefoot.        ED physician interpretation of imaging: Documented in ED course    Medications Given:  Medications - No data to display    Differential Diagnosis included but not limited to:  Such as:  Fracture, dislocation, foreign body, arterial injury, nerve injury, infection.    Medical Decision Making  The patient was placed into an examination in room.    Nursing notes were reviewed.    The patient was interviewed and an examination was completed by me with the above findings.        MDM:    This is an 88-year-old male who presents to the ED for complaint of wound check of his left heel.  Patient states he has had a wound over the past 2 to 3 months, and he saw his wound care doctor today advised to come to emerged part for evaluation.  He has noticed swelling and redness to the left ankle.  He has not noted any drainage from the heel wound.  Patient denies any fevers, nausea, vomiting, diarrhea associate with this.  On physical exam his vital signs are stable other than hypertension 128/55.  He does have a large wound noted to the left heel without any purulent drainage.  There is soft tissue edema noted to the entirety of the left foot.  There is

## 2024-03-07 NOTE — ED NOTES
Bedside and Verbal shift change report given to CONTRERAS Jimenez (oncoming nurse) by CONTRERAS Díaz (offgoing nurse). Report included the following information Nurse Handoff Report, Index, ED Encounter Summary, ED SBAR, Adult Overview, MAR, and Recent Results.

## 2024-03-07 NOTE — ED TRIAGE NOTES
Pt to er via ems from assisted living facility for evaluation of wound on left heel. Pt was seen by wound care team at facility this am who referred him here. Denies fever, or chills.

## 2024-03-07 NOTE — CONSULTS
[facility]   History and Physical Note   Referring Provider: Fidel Sarmiento Jr MD  Reason for Referral: Non healing draining gangrenous heel wound and bilateral toe gangrene    Franklin Leger  MEDICAL RECORD NUMBER:  [id number]  AGE: 88 y.o.   GENDER: male  : 1935  EPISODE DATE:  3/7/2024    Chief complaint and reason for visit:     Chief Complaint   Patient presents with non healing draining gangrenous heel wound and bilateral toe gangrene    Wound Check         HISTORY of PRESENT ILLNESS HPI     Franklin Leger is a 88 y.o. male who presents today for an initial evaluation of a wound/ulcer. Patient is new to the wound center. Wound duration:  2 month(s).  Patient is a poor historian.     History of Wound Context: Mr Leger, with a history of diabetic neuropathy, CHF and CVA, wheelchair bound,  comes with his son-in-law today for an evaluation of a left heel ulcer that was first noted in , presenting with odorous drainage.   He denies any fever or pain. He has noticed increased redness over the last several weeks and increasing size.  Patient went to his PCP who referred him to [facility] ER, who referred patient to a podiatrist, Dr Gaspar(?), who then referred him to Wound Care.    His A1c that can be found in the chart was 7.5 dated 2024.  He claims he does not check his blood sugar at home due to difficulty in drawing blood from his finger.  He was noted to have at a note with his PCP in August glucose levels in the 300 -400+ levels.  Claims he cannot move his legs for some time, could not quantify. Claims to be out of bed everyday but with assistance from his family.  Unclear if he smokes.  Patient not reliable historian.  Denies any SOB, chest pains.     Pertinent associated symptoms: drainage , redness, swelling, and skin discoloration    PAST MEDICAL HISTORY        Diagnosis Date    Cerebral artery occlusion with cerebral infarction (HCC)     CHF

## 2024-03-11 ENCOUNTER — HOME HEALTH ADMISSION (OUTPATIENT)
Dept: HOME HEALTH SERVICES | Facility: HOME HEALTH | Age: 89
End: 2024-03-11
Payer: MEDICARE

## 2024-03-11 ENCOUNTER — HOSPITAL ENCOUNTER (OUTPATIENT)
Age: 89
Discharge: HOME | End: 2024-03-14
Payer: MEDICARE

## 2024-03-11 ENCOUNTER — HOSPITAL ENCOUNTER (OUTPATIENT)
Facility: HOSPITAL | Age: 89
Discharge: HOME | End: 2024-03-11
Attending: RADIOLOGY
Payer: MEDICARE

## 2024-03-11 VITALS
DIASTOLIC BLOOD PRESSURE: 48 MMHG | SYSTOLIC BLOOD PRESSURE: 133 MMHG | TEMPERATURE: 97.8 F | HEART RATE: 71 BPM | RESPIRATION RATE: 16 BRPM

## 2024-03-11 DIAGNOSIS — E13.52: ICD-10-CM

## 2024-03-11 DIAGNOSIS — I96 GANGRENE OF BOTH FEET (HCC): ICD-10-CM

## 2024-03-11 DIAGNOSIS — L97.523: ICD-10-CM

## 2024-03-11 DIAGNOSIS — L97.312 NON-PRESSURE CHRONIC ULCER OF RIGHT ANKLE WITH FAT LAYER EXPOSED (HCC): ICD-10-CM

## 2024-03-11 DIAGNOSIS — L97.423 DIABETIC ULCER OF LEFT HEEL ASSOCIATED WITH TYPE 2 DIABETES MELLITUS, WITH NECROSIS OF MUSCLE (HCC): Primary | ICD-10-CM

## 2024-03-11 DIAGNOSIS — L97.511 ULCER OF TOE OF RIGHT FOOT, LIMITED TO BREAKDOWN OF SKIN (HCC): ICD-10-CM

## 2024-03-11 DIAGNOSIS — E11.621 DIABETIC ULCER OF LEFT HEEL ASSOCIATED WITH TYPE 2 DIABETES MELLITUS, WITH NECROSIS OF MUSCLE (HCC): Primary | ICD-10-CM

## 2024-03-11 PROCEDURE — 11043 DBRDMT MUSC&/FSCA 1ST 20/<: CPT

## 2024-03-11 PROCEDURE — 88304 TISSUE EXAM BY PATHOLOGIST: CPT

## 2024-03-11 PROCEDURE — 11042 DBRDMT SUBQ TIS 1ST 20SQCM/<: CPT

## 2024-03-11 PROCEDURE — 11046 DBRDMT MUSC&/FSCA EA ADDL: CPT

## 2024-03-11 PROCEDURE — 73610 X-RAY EXAM OF ANKLE: CPT

## 2024-03-11 RX ORDER — CLOBETASOL PROPIONATE 0.5 MG/G
OINTMENT TOPICAL ONCE
Status: CANCELLED | OUTPATIENT
Start: 2024-03-11 | End: 2024-03-11

## 2024-03-11 RX ORDER — SODIUM CHLOR/HYPOCHLOROUS ACID 0.033 %
SOLUTION, IRRIGATION IRRIGATION ONCE
Status: CANCELLED | OUTPATIENT
Start: 2024-03-11 | End: 2024-03-11

## 2024-03-11 RX ORDER — SODIUM CHLOR/HYPOCHLOROUS ACID 0.033 %
SOLUTION, IRRIGATION IRRIGATION ONCE
OUTPATIENT
Start: 2024-03-11 | End: 2024-03-11

## 2024-03-11 RX ORDER — BETAMETHASONE DIPROPIONATE 0.5 MG/G
CREAM TOPICAL ONCE
OUTPATIENT
Start: 2024-03-11 | End: 2024-03-11

## 2024-03-11 RX ORDER — BETAMETHASONE DIPROPIONATE 0.5 MG/G
CREAM TOPICAL ONCE
Status: CANCELLED | OUTPATIENT
Start: 2024-03-11 | End: 2024-03-11

## 2024-03-11 RX ORDER — TRIAMCINOLONE ACETONIDE 1 MG/G
OINTMENT TOPICAL ONCE
OUTPATIENT
Start: 2024-03-11 | End: 2024-03-11

## 2024-03-11 RX ORDER — IBUPROFEN 200 MG
TABLET ORAL ONCE
Status: CANCELLED | OUTPATIENT
Start: 2024-03-11 | End: 2024-03-11

## 2024-03-11 RX ORDER — IBUPROFEN 200 MG
TABLET ORAL ONCE
OUTPATIENT
Start: 2024-03-11 | End: 2024-03-11

## 2024-03-11 RX ORDER — TRIAMCINOLONE ACETONIDE 1 MG/G
OINTMENT TOPICAL ONCE
Status: CANCELLED | OUTPATIENT
Start: 2024-03-11 | End: 2024-03-11

## 2024-03-11 RX ORDER — GENTAMICIN SULFATE 1 MG/G
OINTMENT TOPICAL ONCE
OUTPATIENT
Start: 2024-03-11 | End: 2024-03-11

## 2024-03-11 RX ORDER — GENTAMICIN SULFATE 1 MG/G
OINTMENT TOPICAL ONCE
Status: CANCELLED | OUTPATIENT
Start: 2024-03-11 | End: 2024-03-11

## 2024-03-11 RX ORDER — CLOBETASOL PROPIONATE 0.5 MG/G
OINTMENT TOPICAL ONCE
OUTPATIENT
Start: 2024-03-11 | End: 2024-03-11

## 2024-03-11 RX ORDER — BACITRACIN ZINC AND POLYMYXIN B SULFATE 500; 1000 [USP'U]/G; [USP'U]/G
OINTMENT TOPICAL ONCE
OUTPATIENT
Start: 2024-03-11 | End: 2024-03-11

## 2024-03-11 RX ORDER — GINSENG 100 MG
CAPSULE ORAL ONCE
OUTPATIENT
Start: 2024-03-11 | End: 2024-03-11

## 2024-03-11 RX ORDER — GINSENG 100 MG
CAPSULE ORAL ONCE
Status: CANCELLED | OUTPATIENT
Start: 2024-03-11 | End: 2024-03-11

## 2024-03-11 RX ORDER — BACITRACIN ZINC AND POLYMYXIN B SULFATE 500; 1000 [USP'U]/G; [USP'U]/G
OINTMENT TOPICAL ONCE
Status: CANCELLED | OUTPATIENT
Start: 2024-03-11 | End: 2024-03-11

## 2024-03-11 ASSESSMENT — PAIN DESCRIPTION - DESCRIPTORS: DESCRIPTORS: BURNING

## 2024-03-11 ASSESSMENT — PAIN DESCRIPTION - ORIENTATION: ORIENTATION: LEFT

## 2024-03-11 ASSESSMENT — PAIN SCALES - GENERAL: PAINLEVEL_OUTOF10: 4

## 2024-03-11 ASSESSMENT — PAIN DESCRIPTION - LOCATION: LOCATION: FOOT

## 2024-03-11 NOTE — PATIENT INSTRUCTIONS
THE SUPPLIES YOU HAVE AVAILABLE UNTIL YOU ARE ABLE TO REACH US. IT IS MOST IMPORTANT TO KEEP THE WOUND COVERED AT ALL TIMES.     Physician Signature:_______________________    Date: ___________ Time:  ____________

## 2024-03-11 NOTE — FLOWSHEET NOTE
03/11/24 0947   Wound 03/07/24 Heel Left   Date First Assessed/Time First Assessed: 03/07/24 0939   Present on Original Admission: Yes  Wound Approximate Age at First Assessment (Weeks): 8 weeks  Primary Wound Type: Pressure Injury  Location: Heel  Wound Location Orientation: Left   Dressing Status New dressing applied   Wound Cleansed Vashe   Dressing/Treatment   (Santyl, gauze, roll gauze, tape)   Offloading for Diabetic Foot Ulcers Offloading ordered   Wound 03/07/24 Toe (Comment  which one) Left;Anterior third toe   Date First Assessed/Time First Assessed: 03/07/24 0945   Present on Original Admission: Yes  Location: Toe (Comment  which one)  Wound Location Orientation: Left;Anterior  Wound Description (Comments): third toe   Dressing Status New dressing applied   Wound Cleansed Vashe   Dressing/Treatment   (Santyl, gauze, roll gauze, tape)   Offloading for Diabetic Foot Ulcers Offloading ordered   Wound 03/07/24 Toe (Comment  which one) Left;Medial third toe   Date First Assessed/Time First Assessed: 03/07/24 0945   Present on Original Admission: Yes  Location: Toe (Comment  which one)  Wound Location Orientation: Left;Medial  Wound Description (Comments): third toe   Dressing Status New dressing applied   Wound Cleansed Vashe   Dressing/Treatment   (Santyl, gauze, roll gauze, tape)   Offloading for Diabetic Foot Ulcers Offloading ordered   Wound 03/11/24 Toe (Comment  which one) Dorsal;Right First Toe   Date First Assessed/Time First Assessed: 03/11/24 0800   Present on Original Admission: Yes  Wound Approximate Age at First Assessment (Weeks): 2 weeks  Location: Toe (Comment  which one)  Wound Location Orientation: Dorsal;Right  Wound Description (C...   Dressing Status New dressing applied   Wound Cleansed Vashe   Dressing/Treatment   (Santyl, gauze, roll gauze, tape)   Offloading for Diabetic Foot Ulcers Offloading ordered   Wound 03/07/24 Toe (Comment  which one) Right;Anterior second toe   Date First 
  Odor None   Jacqueline-wound Assessment Blanchable erythema   Margins Flat/open edges   Wound Thickness Description not for Pressure Injury Full thickness   Pain Assessment   Pain Assessment 0-10   Pain Level 4   Patient's Stated Pain Goal 0 - No pain   Pain Location Foot   Pain Orientation Left   Pain Descriptors Burning   BP (!) 133/48   Pulse 71   Temp 97.8 °F (36.6 °C) (Temporal)   Resp 16

## 2024-03-11 NOTE — PROGRESS NOTES
Nontender calves. No cyanosis. Edema 2+-3+.  Neurologic: Speech normal. At baseline without new focal deficits. Mental status normal or at baseline. More communicative today.  Coherent.   Multiple wounds - see above images and descriptions above  Pulses - diminished or else cannot feel due to edema.  Present by dopplers    PAST MEDICAL HISTORY        Diagnosis Date    Cerebral artery occlusion with cerebral infarction (HCC)     CHF (congestive heart failure) (HCC)     Diabetes (HCC)     Hx of blood clots     Hypercholesteremia     Hypertension     Prostate cancer (HCC)     Skin cancer     Sunburn, blistering        PAST SURGICAL HISTORY    Past Surgical History:   Procedure Laterality Date    CHOLECYSTECTOMY      CORONARY ARTERY BYPASS GRAFT      HERNIA REPAIR      LUMBAR FUSION         FAMILY HISTORY    Family History   Problem Relation Age of Onset    Cancer Sister     Cancer Father     Heart Disease Mother     Cancer Brother        SOCIAL HISTORY    Social History     Tobacco Use    Smoking status: Never    Smokeless tobacco: Current     Types: Chew    Tobacco comments:     Quit smoking: Chewing tobacco every day 30-40 years; will  try to quit during wound healing.   Substance Use Topics    Alcohol use: Not Currently    Drug use: Never       ALLERGIES    No Known Allergies    MEDICATIONS    Current Outpatient Medications on File Prior to Encounter   Medication Sig Dispense Refill    cefdinir (OMNICEF) 300 MG capsule Take 1 capsule by mouth 2 times daily for 10 days 20 capsule 0    amLODIPine-atorvastatatin (CADUET) 5-10 MG per tablet Take 1 tablet by mouth      aspirin 325 MG tablet Take 1 tablet by mouth daily (Patient not taking: Reported on 3/11/2024)      atorvastatin (LIPITOR) 20 MG tablet Take 1 tablet by mouth daily      glipiZIDE (GLUCOTROL XL) 10 MG extended release tablet Take by mouth 2 times daily      losartan-hydroCHLOROthiazide (HYZAAR) 100-12.5 MG per tablet Take 1 tablet by mouth      metFORMIN

## 2024-03-12 ENCOUNTER — HOSPITAL ENCOUNTER (OUTPATIENT)
Age: 89
Discharge: HOME | End: 2024-03-15
Payer: MEDICARE

## 2024-03-12 DIAGNOSIS — E13.52: ICD-10-CM

## 2024-03-12 DIAGNOSIS — I96 GANGRENE OF BOTH FEET (HCC): ICD-10-CM

## 2024-03-12 DIAGNOSIS — E11.621 DIABETIC ULCER OF LEFT HEEL ASSOCIATED WITH TYPE 2 DIABETES MELLITUS, WITH NECROSIS OF MUSCLE (HCC): ICD-10-CM

## 2024-03-12 DIAGNOSIS — L97.423 DIABETIC ULCER OF LEFT HEEL ASSOCIATED WITH TYPE 2 DIABETES MELLITUS, WITH NECROSIS OF MUSCLE (HCC): ICD-10-CM

## 2024-03-12 PROCEDURE — 87205 SMEAR GRAM STAIN: CPT

## 2024-03-12 PROCEDURE — 87070 CULTURE OTHR SPECIMN AEROBIC: CPT

## 2024-03-12 PROCEDURE — 73720 MRI LWR EXTREMITY W/O&W/DYE: CPT

## 2024-03-12 PROCEDURE — 6360000004 HC RX CONTRAST MEDICATION

## 2024-03-12 PROCEDURE — A9579 GAD-BASE MR CONTRAST NOS,1ML: HCPCS

## 2024-03-12 RX ADMIN — GADOTERIDOL 15 ML: 279.3 INJECTION, SOLUTION INTRAVENOUS at 14:43

## 2024-03-14 ENCOUNTER — TELEPHONE (OUTPATIENT)
Facility: HOSPITAL | Age: 89
End: 2024-03-14

## 2024-03-14 LAB
BACTERIA SPEC CULT: NORMAL
GRAM STN SPEC: NORMAL
SERVICE CMNT-IMP: NORMAL

## 2024-03-14 NOTE — TELEPHONE ENCOUNTER
Spoke to Inoailyn Rufus ([phone]) regarding her grandfather's Santyl prescription.  Name and  confirmed.  [facility] to [facility] that they received order which they did.  They were awaiting insurance information and verification of delivery.  They will call farrah this afternoon.  Called niece back to communicate that she will be hearing from the pharmacy this afternoon.

## 2024-03-15 ENCOUNTER — HOME CARE VISIT (OUTPATIENT)
Facility: HOME HEALTH | Age: 89
End: 2024-03-15

## 2024-03-15 VITALS — TEMPERATURE: 97.3 F | DIASTOLIC BLOOD PRESSURE: 60 MMHG | SYSTOLIC BLOOD PRESSURE: 120 MMHG

## 2024-03-15 PROCEDURE — G0299 HHS/HOSPICE OF RN EA 15 MIN: HCPCS

## 2024-03-15 PROCEDURE — 0221000100 HH NO PAY CLAIM PROCEDURE

## 2024-03-15 ASSESSMENT — ENCOUNTER SYMPTOMS
DYSPNEA ACTIVITY LEVEL: AT REST
BOWEL INCONTINENCE: 1

## 2024-03-15 NOTE — HOME HEALTH
Reason for referral, [facility] SUMMARY of clinical condition: Franklin Portillo admitted to home care for Type 2 DM with foot ulcer. Nursing needed for wound care.     Clinical Assessment/Skilled reason for admission to home health (What this means for the patient overall and need for ongoing skilled care): Franklin Leger is an 88 year old male who lives with wife in one-level home. Referral received from Dr. Rothman at [facility] wound [facility] for wound care to bilateral foot wounds. PMHx. includes CVA, CHF, and diabetic neuropathy. Mr. Leger has multiple bilateral diabetic and pressure wound ulcers with BLE 2+ edema. Patient has large wet gangrenous wound to left heel with generous drainage. There are wounds also on left 3rd toe, right great toe, right second toe, and right ankle with slough. Unable to get pulse ox reading during visit due to cold fingers. He had presented to ED 3/7 for wound check to left heel and started on an antibiotic. The wound clinic consulted with vascular surgeon due to concern of diminished blood flow. Per MD note, he may ultimately need amputation. Patient does not monitor blood sugars at home due to difficulty drawing drop of blood on finger for glucometer strip.  Patient is non-ambulatory and dependent for all ADLs. Wife states patient spends most of the day in recliner chair and has been sleeping a lot. She reports he has not been able to walk for about a year now. Wife reports having difficulty changing dressings on patient's foot due to back problems. Family requested daily visits for one week for wound care and then they will determine who will manage dressing changes on days SN is not coming, if wound care is still ordered for daily. Family also requested order for HHA and MSW consult. Patient has small pressure ulcer to sacrum as well. Visits are needed for wound care to reduce risk of further infections.    Diagnosis: Type 2 DM with foot ulcer.    Subjective (statement from pt/cg that

## 2024-03-16 ENCOUNTER — HOME CARE VISIT (OUTPATIENT)
Facility: HOME HEALTH | Age: 89
End: 2024-03-16

## 2024-03-16 VITALS
RESPIRATION RATE: 18 BRPM | DIASTOLIC BLOOD PRESSURE: 64 MMHG | HEART RATE: 60 BPM | SYSTOLIC BLOOD PRESSURE: 128 MMHG | OXYGEN SATURATION: 97 % | TEMPERATURE: 98.5 F

## 2024-03-16 PROCEDURE — G0299 HHS/HOSPICE OF RN EA 15 MIN: HCPCS

## 2024-03-17 ENCOUNTER — HOME CARE VISIT (OUTPATIENT)
Facility: HOME HEALTH | Age: 89
End: 2024-03-17

## 2024-03-17 VITALS — RESPIRATION RATE: 18 BRPM | SYSTOLIC BLOOD PRESSURE: 130 MMHG | DIASTOLIC BLOOD PRESSURE: 50 MMHG | TEMPERATURE: 96.7 F

## 2024-03-17 PROCEDURE — G0299 HHS/HOSPICE OF RN EA 15 MIN: HCPCS

## 2024-03-18 ENCOUNTER — HOSPITAL ENCOUNTER (OUTPATIENT)
Facility: HOSPITAL | Age: 89
Discharge: HOME | End: 2024-03-18
Attending: RADIOLOGY
Payer: MEDICARE

## 2024-03-18 VITALS
HEART RATE: 80 BPM | RESPIRATION RATE: 18 BRPM | DIASTOLIC BLOOD PRESSURE: 55 MMHG | SYSTOLIC BLOOD PRESSURE: 113 MMHG | TEMPERATURE: 98.6 F

## 2024-03-18 DIAGNOSIS — E13.52: ICD-10-CM

## 2024-03-18 DIAGNOSIS — L97.523: ICD-10-CM

## 2024-03-18 DIAGNOSIS — L97.312 NON-PRESSURE CHRONIC ULCER OF RIGHT ANKLE WITH FAT LAYER EXPOSED (HCC): ICD-10-CM

## 2024-03-18 DIAGNOSIS — L97.511 ULCER OF TOE OF RIGHT FOOT, LIMITED TO BREAKDOWN OF SKIN (HCC): ICD-10-CM

## 2024-03-18 DIAGNOSIS — E11.621 DIABETIC ULCER OF LEFT HEEL ASSOCIATED WITH TYPE 2 DIABETES MELLITUS, WITH NECROSIS OF MUSCLE (HCC): Primary | ICD-10-CM

## 2024-03-18 DIAGNOSIS — L97.423 DIABETIC ULCER OF LEFT HEEL ASSOCIATED WITH TYPE 2 DIABETES MELLITUS, WITH NECROSIS OF MUSCLE (HCC): Primary | ICD-10-CM

## 2024-03-18 PROCEDURE — 11042 DBRDMT SUBQ TIS 1ST 20SQCM/<: CPT

## 2024-03-18 PROCEDURE — 11046 DBRDMT MUSC&/FSCA EA ADDL: CPT

## 2024-03-18 PROCEDURE — 11043 DBRDMT MUSC&/FSCA 1ST 20/<: CPT

## 2024-03-18 RX ORDER — BACITRACIN ZINC AND POLYMYXIN B SULFATE 500; 1000 [USP'U]/G; [USP'U]/G
OINTMENT TOPICAL ONCE
OUTPATIENT
Start: 2024-03-18 | End: 2024-03-18

## 2024-03-18 RX ORDER — GINSENG 100 MG
CAPSULE ORAL ONCE
OUTPATIENT
Start: 2024-03-18 | End: 2024-03-18

## 2024-03-18 RX ORDER — SODIUM CHLOR/HYPOCHLOROUS ACID 0.033 %
SOLUTION, IRRIGATION IRRIGATION ONCE
OUTPATIENT
Start: 2024-03-18 | End: 2024-03-18

## 2024-03-18 RX ORDER — SODIUM CHLOR/HYPOCHLOROUS ACID 0.033 %
SOLUTION, IRRIGATION IRRIGATION ONCE
Status: COMPLETED | OUTPATIENT
Start: 2024-03-18 | End: 2024-03-18

## 2024-03-18 RX ORDER — GENTAMICIN SULFATE 1 MG/G
OINTMENT TOPICAL ONCE
OUTPATIENT
Start: 2024-03-18 | End: 2024-03-18

## 2024-03-18 RX ORDER — BETAMETHASONE DIPROPIONATE 0.5 MG/G
CREAM TOPICAL ONCE
OUTPATIENT
Start: 2024-03-18 | End: 2024-03-18

## 2024-03-18 RX ORDER — IBUPROFEN 200 MG
TABLET ORAL ONCE
OUTPATIENT
Start: 2024-03-18 | End: 2024-03-18

## 2024-03-18 RX ORDER — TRIAMCINOLONE ACETONIDE 1 MG/G
OINTMENT TOPICAL ONCE
OUTPATIENT
Start: 2024-03-18 | End: 2024-03-18

## 2024-03-18 RX ORDER — CLOBETASOL PROPIONATE 0.5 MG/G
OINTMENT TOPICAL ONCE
OUTPATIENT
Start: 2024-03-18 | End: 2024-03-18

## 2024-03-18 RX ADMIN — Medication: at 10:05

## 2024-03-18 NOTE — PATIENT INSTRUCTIONS
Discharge Instructions/Wound Care Orders  [facility]   [address] [address]  Phone: [phone] Fax: [phone]    NAME:  Franklin Leger  YOB: 1935  MEDICAL RECORD NUMBER:  [id number]  DATE:  March 18, 2024    Wound Care Orders:  Cleanse all wounds with vashe solution. Let sit 5-7 minutes, pat dry.     Bilateral foot wounds : Apply santyl ointment to wound bed. Cover with gauze or abd pad. Secure with tape. Change every other day.   Sacral wound: apply xeroform to the wound bed, and cover with foam dressing, change every other day, and, as needed if soiled.     Home health to change three times a week.     Social work consult and evaluate for possible home health aide.       Home health: [facility].     Activity:  [x] Elevate leg(s) above the level of the heart when sitting and avoid prolonged standing in one place.    [x] Wear off-loading shoe/boot on the affected foot when walking.  [x] Use prevalon boots while in bed.   [x] Do not get dressing wet.    Dietary:  [] Diet as tolerated      [x] Diabetic Diet            [] Increase Protein: examples (Meat, cheese, eggs, greek yogurt, fish, nuts)          [x] [employer] Powder    Follow-up:  [x] Return Appointment: With Dr. Tanya Rothman in 1 week.  Please go to ER today for left heel infection.   [] Ordered tests:       Electronically signed by Jacqueline Silva RN  on 3/18/2024 at 8:29 AM     Wound Care Center Information: Should you experience any significant changes in your wound(s) or have questions about your wound care, please contact the [facility] at MONDAY - FRIDAY 8:00 am - 4:30.  If you need help with your wound outside these hours and cannot wait until we are again available, contact your PCP or go to the hospital emergency room.     PLEASE NOTE: IF YOU ARE UNABLE TO OBTAIN WOUND SUPPLIES, CONTINUE TO USE THE SUPPLIES YOU HAVE AVAILABLE UNTIL YOU ARE ABLE

## 2024-03-18 NOTE — PROGRESS NOTES
Etiology Pressure Stage 2 03/18/24 0900   Dressing Status New dressing applied 03/18/24 1040   Dressing/Treatment Xeroform;Foam 03/18/24 1040   Wound Length (cm) 0.5 cm 03/18/24 0900   Wound Width (cm) 0.5 cm 03/18/24 0900   Wound Depth (cm) 0.1 cm 03/18/24 0900   Wound Surface Area (cm^2) 0.25 cm^2 03/18/24 0900   Change in Wound Size % (l*w) 0 03/18/24 0900   Wound Volume (cm^3) 0.025 cm^3 03/18/24 0900   Wound Healing % 0 03/18/24 0900   Wound Assessment Pink/red 03/18/24 0900   Drainage Amount None (dry) 03/18/24 0900   Odor None 03/18/24 0900   Jacqueline-wound Assessment Blanchable erythema;Fragile 03/18/24 0900   Margins Defined edges 03/18/24 0900   Wound Thickness Description not for Pressure Injury Partial thickness 03/18/24 0900   Number of days: 3          Procedures done during this encounter:   Debridement: Excisional Debridement  Indications:  Based on my examination of this patient's wound(s)/ulcer(s) today, debridement is required to promote healing and evaluate the wound base. Risks and benefits discussed with patient who has agreed to proceed.   Performed by: Tanya Rothman MD  Consent obtained:  Yes  Time out taken:  Yes  Pain Control:     Using curette, #15 blade scalpel, and forceps the wound(s)/ulcer(s) was/were debrided down through and including the removal of epidermis, dermis, subcutaneous tissue, and muscle/fascia.      Devitalized Tissue Debrided:  fibrin, biofilm, and slough  Pre Debridement Measurements:  Are located in the Wound/Ulcer Documentation Flow Sheet  Wound/Ulcer #: 8  Post Debridement Measurements:  Wound/Ulcer Descriptions are Pre Debridement except measurements:  Total Surface Area Debrided:  200 sq cm  - muscle and fascia layers; 3.6 to the subcutaneous level  Diabetic/Pressure/Non Pressure Ulcers only:  Ulcer: Diabetic ulcer, fat layer exposed, Diabetic ulcer, muscle necrosis, and Pressure ulcer, Stage 3   Estimated Blood Loss:  Estimated amount of blood loss is

## 2024-03-18 NOTE — FLOWSHEET NOTE
03/18/24 0900   Right Leg Edema Point of Measurement   Compression Therapy Compression not ordered   Left Leg Edema Point of Measurement   Compression Therapy Compression not ordered   RLE Neurovascular Assessment   Capillary Refill Less than/Equal to 3 seconds   Color [place]   Temperature Cool   RLE Sensation  Numbness   LLE Neurovascular Assessment   Capillary Refill Less than/Equal to 3 seconds   Color Red   Temperature Cool   LLE Sensation  Numbness   Wound 03/11/24 Toe (Comment  which one) Dorsal;Right First Toe   Date First Assessed/Time First Assessed: 03/11/24 0800   Present on Original Admission: Yes  Wound Approximate Age at First Assessment (Weeks): 2 weeks  Location: Toe (Comment  which one)  Wound Location Orientation: Dorsal;Right  Wound Description (C...   Wound Image    Dressing Status Old drainage noted;Intact   Wound Cleansed Irrigated with saline   Wound Length (cm) 1 cm   Wound Width (cm) 1 cm   Wound Depth (cm) 0.1 cm   Wound Surface Area (cm^2) 1 cm^2   Change in Wound Size % (l*w) 71.51   Wound Volume (cm^3) 0.1 cm^3   Wound Healing % 72   Wound Assessment Pink/red;Devitalized tissue   Drainage Amount Small (< 25%)   Drainage Description Serosanguinous   Odor None   Jacqueline-wound Assessment Fragile;Maceration   Margins Flat/open edges   Wound Thickness Description not for Pressure Injury Partial thickness   Wound 03/07/24 Toe (Comment  which one) Left;Anterior third toe   Date First Assessed/Time First Assessed: 03/07/24 0945   Present on Original Admission: Yes  Location: Toe (Comment  which one)  Wound Location Orientation: Left;Anterior  Wound Description (Comments): third toe   Wound Image    Dressing Status Old drainage noted;Intact   Wound Cleansed Irrigated with saline   Offloading for Diabetic Foot Ulcers Offloading ordered   Wound Length (cm) 2.4 cm   Wound Width (cm) 1.7 cm   Wound Depth (cm) 0.2 cm   Wound Surface Area (cm^2) 4.08 cm^2   Change in Wound Size % (l*w) -213.85   Wound

## 2024-03-18 NOTE — FLOWSHEET NOTE
03/18/24 1040   Right Leg Edema Point of Measurement   Compression Therapy Compression not ordered   Left Leg Edema Point of Measurement   Compression Therapy Compression not ordered   Wound 03/11/24 Toe (Comment  which one) Dorsal;Right First Toe   Date First Assessed/Time First Assessed: 03/11/24 0800   Present on Original Admission: Yes  Wound Approximate Age at First Assessment (Weeks): 2 weeks  Location: Toe (Comment  which one)  Wound Location Orientation: Dorsal;Right  Wound Description (C...   Dressing Status New dressing applied   Dressing/Treatment   (santyl, gauze, roll guaze, tape)   Wound 03/07/24 Toe (Comment  which one) Left;Anterior third toe   Date First Assessed/Time First Assessed: 03/07/24 0945   Present on Original Admission: Yes  Location: Toe (Comment  which one)  Wound Location Orientation: Left;Anterior  Wound Description (Comments): third toe   Dressing Status New dressing applied   Dressing/Treatment   (santyl, gauze, roll guaze, tape)   Wound 03/07/24 Toe (Comment  which one) Left;Medial third toe   Date First Assessed/Time First Assessed: 03/07/24 0945   Present on Original Admission: Yes  Location: Toe (Comment  which one)  Wound Location Orientation: Left;Medial  Wound Description (Comments): third toe   Dressing Status New dressing applied   Dressing/Treatment   (santyl, gauze, roll guaze, tape)   Wound 03/07/24 Toe (Comment  which one) Right;Anterior second toe   Date First Assessed/Time First Assessed: 03/07/24 0947   Present on Original Admission: Yes  Location: Toe (Comment  which one)  Wound Location Orientation: Right;Anterior  Wound Description (Comments): second toe   Dressing Status New dressing applied   Dressing/Treatment   (santyl, gauze, roll guaze, tape)   Wound 03/07/24 Ankle Right;Lateral   Date First Assessed/Time First Assessed: 03/07/24 0948   Present on Original Admission: Yes  Location: Ankle  Wound Location Orientation: Right;Lateral   Dressing Status New

## 2024-03-19 ENCOUNTER — HOME CARE VISIT (OUTPATIENT)
Facility: HOME HEALTH | Age: 89
End: 2024-03-19
Payer: MEDICARE

## 2024-03-19 ENCOUNTER — TELEPHONE (OUTPATIENT)
Facility: HOSPITAL | Age: 89
End: 2024-03-19

## 2024-03-19 PROCEDURE — G0299 HHS/HOSPICE OF RN EA 15 MIN: HCPCS

## 2024-03-19 ASSESSMENT — ENCOUNTER SYMPTOMS
STOOL DESCRIPTION: SOFT FORMED
BOWEL INCONTINENCE: 1
DYSPNEA ACTIVITY LEVEL: AT REST

## 2024-03-19 NOTE — TELEPHONE ENCOUNTER
Spoke to [place] nurse from [facility]. Verified patient name and . Reports new pressure ulcer to right hip, with new photo uploaded in the chart. Reports Home Health will apply xeroform and border foam every other day. Message sent to Stephan MONROY via Epic

## 2024-03-20 ENCOUNTER — HOME CARE VISIT (OUTPATIENT)
Dept: HOME HEALTH SERVICES | Facility: HOME HEALTH | Age: 89
End: 2024-03-20
Payer: MEDICARE

## 2024-03-20 VITALS
DIASTOLIC BLOOD PRESSURE: 52 MMHG | RESPIRATION RATE: 18 BRPM | HEART RATE: 72 BPM | SYSTOLIC BLOOD PRESSURE: 118 MMHG | TEMPERATURE: 98.5 F

## 2024-03-20 NOTE — HOME HEALTH
Subjective: We went to the wound doctor yesterday and they changed the orders to every other day. He has been sleeping a lot more and his appetite has decreased. He can't really move much.   Falls since last visit Yes(if yes complete the Fall Tracking Form and include bsrifallreport):   Please complete form for all falls whether observed or not observed.    Fall observed by HH Staff?No    Describe Event (please include location of fall and may copy and paste from visit note): Initially, family denied any falls since last clinician visit and then later during SN visit, spouse mentioned that she was on couch and may have drifted off to sleep and woke up to patient in floor by recliner. Spouse was unsure of what happened exactly but thinks patient may have slid out of the recliner reaching for something. Per spouse patient was not injured and she called her son and granddaughter to help get him ouf of the floor. Patient did not recall event.     Document any re-training or treatment plan modifications indicated and the patient/caregiver response (may copy and paste from visit note): SN educated pt and family on making sure items of necessity are in reach, patient always has help when transferring from chair to wheelchair or bed, and to look into possibly getting lifealert necklaces or bracelets in the event that one of them falls and needs assistance (spouse also expressed fear of falls herself). PCP notified and asked for orders for PT/OT to assist with getting DME for patient for transfers.     Assistive Devices used by patient prior to fall:Yes      Was this equipment in use at time of fall? No, pt was in recliner and slide out of chair is the best assumption from the family     Injury? No If yes, describe:    Emergent Care Received (EMS, DH)?NO, If yes, describe:      Was patient identified as High Risk prior to fall? YES    Caregiver involvement changes: none  Home health supplies by type and quantity

## 2024-03-21 ENCOUNTER — HOME CARE VISIT (OUTPATIENT)
Facility: HOME HEALTH | Age: 89
End: 2024-03-21
Payer: MEDICARE

## 2024-03-21 VITALS
DIASTOLIC BLOOD PRESSURE: 62 MMHG | HEART RATE: 70 BPM | RESPIRATION RATE: 16 BRPM | TEMPERATURE: 98.1 F | SYSTOLIC BLOOD PRESSURE: 140 MMHG | OXYGEN SATURATION: 92 %

## 2024-03-21 PROCEDURE — G0300 HHS/HOSPICE OF LPN EA 15 MIN: HCPCS

## 2024-03-22 ENCOUNTER — HOME CARE VISIT (OUTPATIENT)
Facility: HOME HEALTH | Age: 89
End: 2024-03-22
Payer: MEDICARE

## 2024-03-22 ENCOUNTER — TELEPHONE (OUTPATIENT)
Facility: HOSPITAL | Age: 89
End: 2024-03-22

## 2024-03-22 PROCEDURE — G0156 HHCP-SVS OF AIDE,EA 15 MIN: HCPCS

## 2024-03-22 PROCEDURE — G0155 HHCP-SVS OF CSW,EA 15 MIN: HCPCS

## 2024-03-22 NOTE — HOME HEALTH
Subjective: Caregiver:  \"We need a replacement for his Martinez sensor.\"  Falls since last visit No(if yes complete the Fall Tracking Form and include bsrifallreport):   Caregiver involvement changes: No  Home health supplies by type and quantity ordered/delivered this visit include: n/a    Clinician asked if patient has had any physician contact since last home care visit and patient states: NO  Clinician asked if patient has any new or changed medications and patient states:  NO   If Yes, were medications reconciled? N/A   Was the certifying physician notified of changes in medications? N/A     Clinical assessment (what this visit means for the patient overall and need for ongoing skilled care) and progress or lack of progress towards SPECIFIC goals: Pt at risk for rehospitalization r/t fall risk, infection, wound exacerbation.  Wound healing goals not met.    Written Teaching Material Utilized: N/A    Interdisciplinary communication with: N/A    Discharge planning as follows: When caregiver is competent in wound care and wound is 70% healed    Specific plan for next visit: Assessment, wound care, education as needed

## 2024-03-22 NOTE — HOME HEALTH
CLAY completed an initial assessment with the patient and multiple family members at their home. The patient is an 88 year old male with a primary diagnosis of type 2 diabetes mellitus with foot ulcer. He resides in the home with his spouse. His two children, granddaughter, and son in law are also involved with care and present for today's visit. Family had numerous questions regarding covered services and hospice care. CLAY spoke with ENID Sun who will be following up with family for further support as well. ISAIASSW educated family on insurance benefits. Granddaughter states that she has explored services through the Department of  and medicaid but they are over the income limit. ISAIASSW will email resources for paid caregivers if family wishes to go that route. Family may be leaning towards enrolling in hospice care at this time. Family and patient demonstrate understanding of above information with 100% accuracy and were instructed to reach out for any additional needs.

## 2024-03-22 NOTE — TELEPHONE ENCOUNTER
Bing, granddaughter called to state the wound had drained through the bandage and has a smell (which is not a change from last assessment). Yoan fever, yoan body aches, yoan increased pain. States there is burning. States HH nurse was there yesterday and would be coming tomorrow, Saturday. Educated daughter to reinforce dressing with additional layer of gauze or to change the dressing. Granddaughter states she will reinforce dressing today.

## 2024-03-23 ENCOUNTER — HOME CARE VISIT (OUTPATIENT)
Facility: HOME HEALTH | Age: 89
End: 2024-03-23
Payer: MEDICARE

## 2024-03-23 VITALS
DIASTOLIC BLOOD PRESSURE: 42 MMHG | SYSTOLIC BLOOD PRESSURE: 98 MMHG | HEART RATE: 80 BPM | TEMPERATURE: 98.5 F | RESPIRATION RATE: 18 BRPM

## 2024-03-23 PROCEDURE — G0299 HHS/HOSPICE OF RN EA 15 MIN: HCPCS

## 2024-03-23 ASSESSMENT — ENCOUNTER SYMPTOMS: BOWEL INCONTINENCE: 1

## 2024-03-23 NOTE — HOME HEALTH
Subjective: \"some burning in my feet.\"  Falls since last visit No(if yes complete the Fall Tracking Form and include bsrifallreport):   Caregiver involvement changes: no  Home health supplies by type and quantity ordered/delivered this visit include: order currently shipped ([employer])    Clinician asked if patient has had any physician contact since last home care visit and patient states: NO  Clinician asked if patient has any new or changed medications and patient states:  NO   If Yes, were medications reconciled? N/A   Was the certifying physician notified of changes in medications? N/A     Clinical assessment (what this visit means for the patient overall and need for ongoing skilled care) and progress or lack of progress towards SPECIFIC goals:   Spouse called Dr. Sarmiento during visit and reported blood pressure 90/40's and instructed spouse regarding making sure patient is staying hydrated. Unable to obtain spo2 due to hand callous but patient HR wnl and patient reported not feeling short of breath or any respiratory distress noted. SN spoke with PCP during visits Dr. Sarmiento and reported nursing concerns regarding new wound on lateral left foot, increase in swelling and redness, patient pale, low blood pressure and concerns of sepsis. Dr. Sarmiento requested patient go to [facility], EMS called for patient. SN was able to provide wound care to bilateral feet, unable to obtain measurements due to emergency. Patient spouse/son/family friend present and discussion regarding hospice/pallaitve care was expressed. SN reported that reaching out to hospice/palliative care with [facility] would be recommended for further education/recommendations. Family verbalized understanding. Patient reported some tingling/burning in feet but he usually does not feel pain.        Written Teaching Material Utilized: wound care supplies    Interdisciplinary communication with:Tanya Rothman MD; Forest Coulter, CONTRERAS;

## 2024-03-25 ENCOUNTER — HOME CARE VISIT (OUTPATIENT)
Dept: HOME HEALTH SERVICES | Facility: HOME HEALTH | Age: 89
End: 2024-03-25
Payer: MEDICARE

## 2024-03-25 ENCOUNTER — HOME CARE VISIT (OUTPATIENT)
Facility: HOME HEALTH | Age: 89
End: 2024-03-25
Payer: MEDICARE

## 2024-03-25 ENCOUNTER — TELEPHONE (OUTPATIENT)
Facility: HOSPITAL | Age: 89
End: 2024-03-25

## 2024-03-25 NOTE — TELEPHONE ENCOUNTER
Patient's granddaughter Bing called (patient name/ verified.) Reports patient was febrile, with low blood pressure and increased wound drainage to the left heel. Patient went to [facility]. 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 Pt's granddaughter reports that she will follow-up with clinic after discharge.